# Patient Record
Sex: FEMALE | Race: WHITE | URBAN - METROPOLITAN AREA
[De-identification: names, ages, dates, MRNs, and addresses within clinical notes are randomized per-mention and may not be internally consistent; named-entity substitution may affect disease eponyms.]

---

## 2018-04-02 ENCOUNTER — HOSPITAL ENCOUNTER (EMERGENCY)
Facility: CLINIC | Age: 53
Discharge: HOME | End: 2018-04-02
Attending: PHYSICIAN ASSISTANT | Admitting: PHYSICIAN ASSISTANT
Payer: COMMERCIAL

## 2018-04-02 VITALS
SYSTOLIC BLOOD PRESSURE: 134 MMHG | OXYGEN SATURATION: 97 % | TEMPERATURE: 99.1 F | HEART RATE: 85 BPM | RESPIRATION RATE: 16 BRPM | DIASTOLIC BLOOD PRESSURE: 103 MMHG

## 2018-04-02 DIAGNOSIS — S81.801A WOUND OF RIGHT LOWER EXTREMITY, INITIAL ENCOUNTER: ICD-10-CM

## 2018-04-02 DIAGNOSIS — I83.891 BLEEDING FROM VARICOSE VEINS OF LOWER EXTREMITY, RIGHT: ICD-10-CM

## 2018-04-02 PROCEDURE — 99282 EMERGENCY DEPT VISIT SF MDM: CPT

## 2018-04-02 RX ORDER — LIDOCAINE HYDROCHLORIDE AND EPINEPHRINE 10; 10 MG/ML; UG/ML
INJECTION, SOLUTION INFILTRATION; PERINEURAL
Status: DISCONTINUED
Start: 2018-04-02 | End: 2018-04-02 | Stop reason: HOSPADM

## 2018-04-02 ASSESSMENT — ENCOUNTER SYMPTOMS
FEVER: 0
WOUND: 1

## 2018-04-02 NOTE — ED NOTES
Patient comes in for evaluation of a bleeding wound. Patient states she had a small scratch wound on her leg right near her verucose vein, had cleaned it up this evening with antiseptic and was walking around her kitchen and heard a pop and it started bleeding a lot. Bleeding is currently controlled. ABCs intact.

## 2018-04-02 NOTE — ED AVS SNAPSHOT
[facility] Emergency Department    [address]    [facility] [address]    Phone:  [phone]    Fax:  [phone]                                       Sherley Fraser   MRN: [id number]    Department:  [facility] Emergency Department   Date of Visit:  4/2/2018           After Visit Summary Signature Page     I have received my discharge instructions, and my questions have been answered. I have discussed any challenges I see with this plan with the nurse or doctor.    ..........................................................................................................................................  Patient/Patient Representative Signature      ..........................................................................................................................................  Patient Representative Print Name and Relationship to Patient    ..................................................               ................................................  Date                                            Time    ..........................................................................................................................................  Reviewed by Signature/Title    ...................................................              ..............................................  Date                                                            Time

## 2018-04-03 ENCOUNTER — HOSPITAL ENCOUNTER (EMERGENCY)
Facility: CLINIC | Age: 53
Discharge: HOME | End: 2018-04-03
Attending: INTERNAL MEDICINE | Admitting: INTERNAL MEDICINE
Payer: COMMERCIAL

## 2018-04-03 VITALS
SYSTOLIC BLOOD PRESSURE: 104 MMHG | WEIGHT: 150 LBS | TEMPERATURE: 98.3 F | RESPIRATION RATE: 18 BRPM | OXYGEN SATURATION: 98 % | DIASTOLIC BLOOD PRESSURE: 70 MMHG

## 2018-04-03 DIAGNOSIS — I83.891 HEMORRHAGE OF VARICOSE VEINS OF LOWER EXTREMITY, RIGHT: ICD-10-CM

## 2018-04-03 LAB
BASOPHILS # BLD AUTO: 0.1 10E9/L (ref 0–0.2)
BASOPHILS NFR BLD AUTO: 0.6 %
DIFFERENTIAL METHOD BLD: NORMAL
EOSINOPHIL # BLD AUTO: 0.3 10E9/L (ref 0–0.7)
EOSINOPHIL NFR BLD AUTO: 2.9 %
ERYTHROCYTE [DISTWIDTH] IN BLOOD BY AUTOMATED COUNT: 12.2 % (ref 10–15)
HCT VFR BLD AUTO: 38.9 % (ref 35–47)
HGB BLD-MCNC: 12.8 G/DL (ref 11.7–15.7)
IMM GRANULOCYTES # BLD: 0 10E9/L (ref 0–0.4)
IMM GRANULOCYTES NFR BLD: 0.4 %
LYMPHOCYTES # BLD AUTO: 2 10E9/L (ref 0.8–5.3)
LYMPHOCYTES NFR BLD AUTO: 21 %
MCH RBC QN AUTO: 29.2 PG (ref 26.5–33)
MCHC RBC AUTO-ENTMCNC: 32.9 G/DL (ref 31.5–36.5)
MCV RBC AUTO: 89 FL (ref 78–100)
MONOCYTES # BLD AUTO: 0.8 10E9/L (ref 0–1.3)
MONOCYTES NFR BLD AUTO: 8.3 %
NEUTROPHILS # BLD AUTO: 6.5 10E9/L (ref 1.6–8.3)
NEUTROPHILS NFR BLD AUTO: 66.8 %
NRBC # BLD AUTO: 0 10*3/UL
NRBC BLD AUTO-RTO: 0 /100
PLATELET # BLD AUTO: 284 10E9/L (ref 150–450)
RBC # BLD AUTO: 4.38 10E12/L (ref 3.8–5.2)
WBC # BLD AUTO: 9.7 10E9/L (ref 4–11)

## 2018-04-03 PROCEDURE — 36415 COLL VENOUS BLD VENIPUNCTURE: CPT | Performed by: INTERNAL MEDICINE

## 2018-04-03 PROCEDURE — 25000125 ZZHC RX 250

## 2018-04-03 PROCEDURE — 85025 COMPLETE CBC W/AUTO DIFF WBC: CPT | Performed by: INTERNAL MEDICINE

## 2018-04-03 PROCEDURE — 25000132 ZZH RX MED GY IP 250 OP 250 PS 637: Performed by: INTERNAL MEDICINE

## 2018-04-03 PROCEDURE — 12001 RPR S/N/AX/GEN/TRNK 2.5CM/<: CPT

## 2018-04-03 PROCEDURE — 99283 EMERGENCY DEPT VISIT LOW MDM: CPT

## 2018-04-03 RX ORDER — IBUPROFEN 600 MG/1
600 TABLET, FILM COATED ORAL EVERY 6 HOURS PRN
Qty: 20 TABLET | Refills: 0 | Status: SHIPPED | OUTPATIENT
Start: 2018-04-03

## 2018-04-03 RX ORDER — LIDOCAINE HYDROCHLORIDE AND EPINEPHRINE 10; 10 MG/ML; UG/ML
INJECTION, SOLUTION INFILTRATION; PERINEURAL
Status: COMPLETED
Start: 2018-04-03 | End: 2018-04-03

## 2018-04-03 RX ORDER — IBUPROFEN 600 MG/1
600 TABLET, FILM COATED ORAL ONCE
Status: COMPLETED | OUTPATIENT
Start: 2018-04-03 | End: 2018-04-03

## 2018-04-03 RX ADMIN — IBUPROFEN 600 MG: 600 TABLET ORAL at 02:02

## 2018-04-03 RX ADMIN — LIDOCAINE HYDROCHLORIDE,EPINEPHRINE BITARTRATE: 10; .01 INJECTION, SOLUTION INFILTRATION; PERINEURAL at 02:02

## 2018-04-03 ASSESSMENT — ENCOUNTER SYMPTOMS
DIZZINESS: 1
LIGHT-HEADEDNESS: 1
WOUND: 1

## 2018-04-03 NOTE — ED AVS SNAPSHOT
[facility] Emergency Department    [address] [place] [address]    Phone:  [phone]    Fax:  [phone]                                       Sherley Fraser   MRN: [id number]    Department:  [facility] Emergency Department   Date of Visit:  4/3/2018           Patient Information     Date Of Birth          1965        Your diagnoses for this visit were:     Hemorrhage of varicose veins of lower extremity, right        You were seen by Love Lugo MD.      Discharge References/Attachments     VARICOSE VEINS (ENGLISH)      24 Hour Appointment Hotline       To make an appointment at any [facility] clinic, call [phone] ([phone]). If you don't have a family doctor or clinic, we will help you find one. [facility] clinics are conveniently located to serve the needs of you and your family.             Review of your medicines      Our records show that you are taking the medicines listed below. If these are incorrect, please call your family doctor or clinic.        Dose / Directions Last dose taken    NYQUIL PO        Refills:  0          ASK your doctor about these medications        Dose / Directions Last dose taken    * IBUPROFEN PO   What changed:  Another medication with the same name was added. Make sure you understand how and when to take each.   Ask about: Which instructions should I use?        Refills:  0        * ibuprofen 600 MG tablet   Commonly known as:  ADVIL/MOTRIN   Dose:  600 mg   What changed:  You were already taking a medication with the same name, and this prescription was added. Make sure you understand how and when to take each.   Quantity:  20 tablet   Ask about: Which instructions should I use?        Take 1 tablet (600 mg) by mouth every 6 hours as needed for moderate pain   Refills:  0        * Notice:  This list has 2 medication(s) that are the same as other medications prescribed for you. Read the directions carefully,  and ask your doctor or other care provider to review them with you.            Prescriptions were sent or printed at these locations (1 Prescription)                   Other Prescriptions                Printed at Department/Unit printer (1 of 1)         ibuprofen (ADVIL/MOTRIN) 600 MG tablet                Procedures and tests performed during your visit     CBC + differential      Orders Needing Specimen Collection     None      Pending Results     No orders found from 4/1/2018 to 4/4/2018.            Pending Culture Results     No orders found from 4/1/2018 to 4/4/2018.            Pending Results Instructions     If you had any lab results that were not finalized at the time of your Discharge, you can call the ED Lab Result RN at [phone]. You will be contacted by this team for any positive Lab results or changes in treatment. The nurses are available 7 days a week from 10A to 6:30P.  You can leave a message 24 hours per day and they will return your call.        Test Results From Your Hospital Stay        4/3/2018  1:36 AM      Component Results     Component Value Ref Range & Units Status    WBC 9.7 4.0 - 11.0 10e9/L Final    RBC Count 4.38 3.8 - 5.2 10e12/L Final    Hemoglobin 12.8 11.7 - 15.7 g/dL Final    Hematocrit 38.9 35.0 - 47.0 % Final    MCV 89 78 - 100 fl Final    MCH 29.2 26.5 - 33.0 pg Final    MCHC 32.9 31.5 - 36.5 g/dL Final    RDW 12.2 10.0 - 15.0 % Final    Platelet Count 284 150 - 450 10e9/L Final    Diff Method Automated Method  Final    % Neutrophils 66.8 % Final    % Lymphocytes 21.0 % Final    % Monocytes 8.3 % Final    % Eosinophils 2.9 % Final    % Basophils 0.6 % Final    % Immature Granulocytes 0.4 % Final    Nucleated RBCs 0 0 /100 Final    Absolute Neutrophil 6.5 1.6 - 8.3 10e9/L Final    Absolute Lymphocytes 2.0 0.8 - 5.3 10e9/L Final    Absolute Monocytes 0.8 0.0 - 1.3 10e9/L Final    Absolute Eosinophils 0.3 0.0 - 0.7 10e9/L Final    Absolute Basophils 0.1 0.0 - 0.2 10e9/L  Final    Abs Immature Granulocytes 0.0 0 - 0.4 10e9/L Final    Absolute Nucleated RBC 0.0  Final                Clinical Quality Measure: Blood Pressure Screening     Your blood pressure was checked while you were in the emergency department today. The last reading we obtained was  BP: 126/85 (Simultaneous filing. User may not have seen previous data.) . Please read the guidelines below about what these numbers mean and what you should do about them.  If your systolic blood pressure (the top number) is less than 120 and your diastolic blood pressure (the bottom number) is less than 80, then your blood pressure is normal. There is nothing more that you need to do about it.  If your systolic blood pressure (the top number) is 120-139 or your diastolic blood pressure (the bottom number) is 80-89, your blood pressure may be higher than it should be. You should have your blood pressure rechecked within a year by a primary care provider.  If your systolic blood pressure (the top number) is 140 or greater or your diastolic blood pressure (the bottom number) is 90 or greater, you may have high blood pressure. High blood pressure is treatable, but if left untreated over time it can put you at risk for heart attack, stroke, or kidney failure. You should have your blood pressure rechecked by a primary care provider within the next 4 weeks.  If your provider in the emergency department today gave you specific instructions to follow-up with your doctor or provider even sooner than that, you should follow that instruction and not wait for up to 4 weeks for your follow-up visit.        Thank you for choosing [facility]       Thank you for choosing [facility] for your care. Our goal is always to provide you with excellent care. Hearing back from our patients is one way we can continue to improve our services. Please take a few minutes to complete the written survey that you may receive in the mail after you visit with us. Thank you!    "     MyChart Information     [facility] lets you send messages to your doctor, view your test results, renew your prescriptions, schedule appointments and more. To sign up, go to [email or web address] . Click on \"Log in\" on the left side of the screen, which will take you to the Welcome page. Then click on \"Sign up Now\" on the right side of the page.     You will be asked to enter the access code listed below, as well as some personal information. Please follow the directions to create your username and password.     Your access code is: [email or web address]  Expires: 2018  9:20 PM     Your access code will  in 90 days. If you need help or a new code, please call your [facility] clinic or [phone].        Care EveryWhere ID     This is your Care EveryWhere ID. This could be used by other organizations to access your [facility] medical records  [id number]        Equal Access to Services     FERMIN GODOY : Hadyaneli edwardso Sohesham, waaxda lufavian, qaybta kaalmachidi pritchard, esau bailey . So [facility] [phone].    ATENCIÓN: Si habla español, tiene a richards disposición servicios gratuitos de asistencia lingüística. Llame al [phone].    We comply with applicable federal civil rights laws and [place] laws. We do not discriminate on the basis of race, color, national origin, age, disability, sex, sexual orientation, or gender identity.            After Visit Summary       This is your record. Keep this with you and show to your community pharmacist(s) and doctor(s) at your next visit.                  "

## 2018-04-03 NOTE — ED AVS SNAPSHOT
[facility] Emergency Department    [address]    [facility] [address]    Phone:  [phone]    Fax:  [phone]                                       Sherley Fraser   MRN: [id number]    Department:  [facility] Emergency Department   Date of Visit:  4/3/2018           After Visit Summary Signature Page     I have received my discharge instructions, and my questions have been answered. I have discussed any challenges I see with this plan with the nurse or doctor.    ..........................................................................................................................................  Patient/Patient Representative Signature      ..........................................................................................................................................  Patient Representative Print Name and Relationship to Patient    ..................................................               ................................................  Date                                            Time    ..........................................................................................................................................  Reviewed by Signature/Title    ...................................................              ..............................................  Date                                                            Time

## 2018-04-03 NOTE — ED PROVIDER NOTES
Emergency Department Attending Supervision Note  4/2/2018  8:42 PM      I evaluated this patient in conjunction with Leyla ORELLANA      Briefly, the patient presented with bleeding to the right lower extremity.  Patient noted that over the last couple months she has developed a ulcer to the right medial lower leg.  She has had a number of evaluations in which ultimately the ulceration has continued without obvious source.  She is scheduled to see a wound clinic tomorrow.  This evening she heard a pop with subsequent spontaneous bleeding from the wound.  She was unable to control the bleeding with direct pressure.      On my exam, patient has a 2 cm ulceration without surrounding erythema, warmth or tenderness to the right medial lower leg just proximal to the ankle.  There is minimal bleeding at this time.    The area was injected with lidocaine with epinephrine and Gelfoam applied with good hemostasis.  Patient was rechecked with no residual bleeding.  Patient to follow-up with wound clinic for general wound management but may also need referral to dermatology to ensure that this atraumatic wound does not represent atypical malignancy.        Diagnosis    (S81.801A) Wound of right lower extremity, initial encounter    (I83.891) Bleeding from varicose veins of lower extremity, right    Tato Bello MD  04/02/18 8316

## 2018-04-03 NOTE — ED PROVIDER NOTES
History     Chief Complaint:  Wound check    HPI   Sherley Fraser is a 53 year old female who presents for a wound check. The patient reports that she was seen here in the emergency department earlier today due to a wound on her right medial shin. The wound was evaluated and bandaged with gel foam. The patient arrived home this evening and when brushing her teeth she noticed that the wound was bleeding through the bandaging. She then became light headed and dizzy which caused her to become concerned and her  called 911. Here in the ED the patient states that her light headedness has improved. Of note the patient does have an appointment with the wound clinic tomorrow.    Allergies:  Bactrim     Medications:    Bactrim cream    Past Medical History:    The patient denies any relevant past medical history.     Past Surgical History:    Cardiac radiofrequency ablation    Family History:    The patient denies any relevant family medical history.     Social History:  Marital Status:   [2]    Review of Systems   Skin: Positive for wound.   Neurological: Positive for dizziness and light-headedness.   All other systems reviewed and are negative.    Physical Exam   Vitals:  Patient Vitals for the past 24 hrs:   BP Temp Temp src Heart Rate Resp SpO2 Weight   04/03/18 0209 104/70 98.3  F (36.8  C) Oral 78 18 98 % -   04/03/18 0130 - - - - - 96 % -   04/03/18 0115 - - - - - 96 % -   04/03/18 0100 - - - - - 98 % -   04/03/18 0055 126/85 98  F (36.7  C) Oral 76 18 98 % 68 kg (150 lb)        Physical Exam   Constitutional: She is cooperative.   Cardiovascular: Normal pulses.    Musculoskeletal:        Legs:  Neurological: She is alert. No sensory deficit.   Skin: Lesion noted.       Emergency Department Course     Laboratory:  Laboratory findings were communicated with the patient who voiced understanding of the findings.  CBC: AWNL (WBC 9.7, HGB 12.8, )     Interventions:  0202 Advil 600 mg  oral    Procedures:     Suture Placement         LOCATION:  Right lower leg.    FUNCTION:  Distally sensation and circulation are intact.    ANESTHESIA:  Local using Lidocaine 1% with epinephrine. With injection, there was a small amount of bleeding from upper edge of ulcer.      PREPARATION:  Soft scrub with surclens.    SUTURES:  Two figure of x sutures were placed, over edge that had bleeding and over central prominence.       Emergency Department Course:  Nursing notes and vitals reviewed.  I performed an exam of the patient as documented above.   IV was inserted and blood was drawn for laboratory testing, results above.    I discussed the treatment plan with the patient. They expressed understanding of this plan and consented to discharge. They will be discharged home with instructions for care and follow up. In addition, the patient will return to the emergency department if their symptoms persist, worsen, if new symptoms arise or if there is any concern.  All questions were answered.     I personally reviewed the laboratory results with the patient and answered all related questions prior to discharge.    Impression & Plan      Medical Decision Making:  Sherley Fraser is a 53 year old female seen in this emergency department only hours ago who returned after her leg ulcer began bleeding again spontaneously. With cleaning here, there was a small area at the edge of the wound with bleeding as well as a prominent central area that appeared to perhaps be a varicosity. Given her return, I placed two figure of x sutures in this area. There was no further bleeding here. We did have her ambulate which caused no bleeding. She felt light headed at home. CBC was checked and hemoglobin is adequate at 12.8. She has a wound clinic appointment in the morning which I am eager for her to keep. I will have her rest at home, return if bleeding occurs more.Keep clinic appointment in the morning.      Diagnosis:    ICD-10-CM    1.  Hemorrhage of varicose veins of lower extremity, right I83.891         Disposition:   Discharged    CMS Diagnoses: None     Discharge Medications:  Discharge Medication List as of 4/3/2018  2:06 AM      START taking these medications    Details   !! ibuprofen (ADVIL/MOTRIN) 600 MG tablet Take 1 tablet (600 mg) by mouth every 6 hours as needed for moderate pain, Disp-20 tablet, R-0, Local Print       !! - Potential duplicate medications found. Please discuss with provider.          Scribe Disclosure:  I, Javi Rajput, am serving as a scribe at 12:57 AM on 4/3/2018 to document services personally performed by Love Lugo MD, based on my observations and the provider's statements to me.   [facility] EMERGENCY DEPARTMENT       Love Lugo MD  04/03/18 8431

## 2018-04-03 NOTE — ED NOTES
Bed: ED15  Expected date: 4/3/18  Expected time: 12:51 AM  Means of arrival: Ambulance  Comments:  [phone]

## 2018-04-03 NOTE — DISCHARGE INSTRUCTIONS
Please see wound specialist as scheduled tomorrow. Please return for uncontrolled bleeding, fever, spreading redness, or any other concerning symptoms.     Varicose Veins     Varicose veins are swollen, enlarged veins most often found in the legs. They are usually blue or purple in color and may bulge, twist, and stand out under the skin.  Normally, veins return blood from the body to the heart. The leg veins have one-way valves that prevent blood from flowing backward in the vein. When the valves are weak or damaged, blood backs up in the veins. This may cause some of the veins to swell and bulge and become varicose veins.  Symptoms  Varicose veins may or may not cause symptoms. If symptoms do occur, they can include:    Legs that feel tired, achy, heavy, or itchy    Leg muscle cramps    Skin changes, such as discoloration, dryness, redness, or rash (in more severe cases, you may also have sores on the skin called venous leg ulcers)  Risk Factors  There are a number of factors that increase the risk for varicose veins. These can include:    Being a woman    Being older    Sitting or standing for long periods    Being overweight    Being pregnant    Having a family history of varicose veins  Treatment begins with simple self-help measures (see below). If these don t help, there are many procedures that can be done to shrink or remove varicose veins. Your healthcare provider can tell you more about these options, if needed.  Home care    Support or compression stockings will likely be prescribed. If so, be sure to wear them as directed. They may help improve blood flow.    Exercising helps strengthen your leg muscles and improve blood flow. To get the most benefit, choose exercises such as walking, swimming, or cycling. Also try to exercise for at least 30 minutes on most days.    Raising (elevating) your legs lets gravity help blood flow back to the heart. Sit or lie with your feet above heart level a few times  throughout the day, or as directed.    Avoid long periods of sitting or standing. Change positions often. Also, move your ankles, toes and knees often. This may also help improve blood flow.    If you are overweight, talk with your healthcare provider about setting up a weight-loss plan. Maintaining a healthy weight can help reduce the strain on your veins. It may also improve symptoms, such as swelling and aching.    If you have dryness and itching, ask your provider about special lotions that can be applied to the skin to help improve symptoms.  Follow-up care  Follow up with your healthcare provider, or as directed. If imaging tests were done, you ll be told the results and if there are any new findings that affect your care.  When to seek medical advice  Call your healthcare provider right away if any of these occur:    Sudden, severe leg swelling, pain, or redness    Symptoms worsen, or they don t improve with self-care    Bleeding from any affected veins    Ulcers form on the legs, ankles, or feet    Fever of 100.4 F (38 C) or higher, or as advised by your provider  Date Last Reviewed: 9/21/2015 2000-2017 [employer]. [address] [place] [address]. All rights reserved. This information is not intended as a substitute for professional medical care. Always follow your healthcare professional's instructions.

## 2018-04-03 NOTE — ED NOTES
Pt complains of dizziness and blood draining from vericose vein that was treated earlier here.  Per EMS no blood draining with dressing change

## 2024-06-04 NOTE — ED PROVIDER NOTES
"  History     Chief Complaint:  Wound Check    HPI   Sherley Fraser is a 53 year old female who presents to the emergency department today for evaluation of a wound check. The patient reports she has had a chronic sore on her right lower leg for a couple of months.  She noted this was near her varicose vein. She went to an [facility] for evaluation a month ago and was started on antibiotics and given a topical cream. However, there has been no significant change. Then tonight after changing the dressing and walking around her kitchen she head a \"pop\" with subsequent blood dripping from the wound. She is unable to stop the bleeding, therefore, prompting the visit to the ED for further evaluation. Patient endorses pain to the area, but did not take tylenol or ibuprofen prior to arrival. The patient denies fevers, lightheadedness, dizziness, or any other acute symptoms.     Allergies:  No Known Drug Allergies     Medications:    The patient is currently on no regular medications.    Past Medical History:    History reviewed. No pertinent past medical history.    Past Surgical History:    Cardiac radiofrequency ablation    Family History:    History reviewed. No pertinent family history.     Social History:  The patient was accompanied to the ED by with her daughter.  Marital Status:     Review of Systems   Constitutional: Negative for fever.   Skin: Positive for wound (right lower leg).   All other systems reviewed and are negative.    Physical Exam     Patient Vitals for the past 24 hrs:   BP Temp Temp src Pulse Resp SpO2   04/02/18 1842 (!) 134/103 99.1  F (37.3  C) Temporal 85 16 97 %     Physical Exam  General: Resting comfortably.  Alert and oriented.   Head:  The scalp, face, and head appear normal   CV:  Regular rate and rhythm     Normal S1/S2    No pathological murmur detected   Resp:  Lungs are clear to auscultation    Non-labored    No rales or wheezing   Skin:  Chronic appearing ulcer noted to the " 2024  EMPLOYEE INFORMATION: EMPLOYER INFORMATION:   NAME: Rubio MELENDEZ [employer]   : 1985 [phone]    DATE OF INJURY/EVENT: 6/3/2024           Location: [facility] [place]   Treating Provider: Mau Martel DO  Time In:  2:42 PM Time Out:  4:13 PM        DIAGNOSIS:   1. Cellulitis of finger of right hand    2. Encounter related to worker's compensation claim    3. Puncture wound of finger, initial encounter      STATUS: This injury is determined to be WORK RELATED.    RETURN TO WORK: Employee may return to work without restrictions.               RESTRICTIONS:  No Restrictions    TREATMENT PLAN:   Medications for this injury/condition: Take your antibiotic as prescribed, follow the directions on the bottle.  Take the antibiotic with a yogurt or probiotic to replenish good bacteria in your stomach/intestinal tract while taking the antibiotic.    Ibuprofen: Take 200 mg (one over the counter tablet) every 4 to 6 hours as needed; if no relief, may increase to 400 mg every 4 to 6 hours as needed; maximum dose: 1.2 g/day.  You can also take over the counter Acetaminophen (tylenol) as needed, follow instructions on the bottle.      Referral/Consult: None  Diagnostic Testing: XR FINGER(S) 2 OR MORE VIEWS RIGHT       Instructions: Return if there is any worsening to the redness, warmth, and swelling while taking the antibiotic.    Keep your puncture wound covered with a band-aid and change your bandaging if it becomes wet or soiled at work and home.     NEXT RETURN VISIT:  3 days for a wound check.     2024 at 3:00 pm          Thank you for the privilege of providing medical care for this injury/condition.  If there are any questions, please call the occupational health clinic at Dept: [phone].      Electronically signed on 2024 at 4:13 PM by:   Mau Martel DO   [facility]       right medial lower extremity with no signs of infection. Varicose vein is bleeding, but is controlled with pressure.     Emergency Department Course     Emergency Department Course:  Nursing notes and vitals reviewed.  2005: I performed an exam of the patient as documented above.   2030: Dr. Barrow evaluated the patient.   2106: Patient rechecked and updated.   Findings and plan explained to the Patient and daughter. Patient discharged home with instructions regarding supportive care, medications, and reasons to return. The importance of close follow-up was reviewed.     Impression & Plan      Medical Decision Making:  Sherley Fraser is a 53 year old female who presents to the emergency department today for evaluation of a wound check.  She presents vitally stable and afebrile.  On exam there is an obvious varicose vein that is bleeding.  This is in conjunction with a chronic appearing ulcer to the right lower leg.  There are no signs of infection or surrounding cellulitis.  Given the presentation, Dr. Barrow was consulted and staffed this patient with me.  Please refer to his note for further details.  Lidocaine with epinephrine was infiltrated around the vein which stopped the bleeding.  Then, Surgicel was applied and a pressure dressing.  The patient was observed for 30 minutes without rebleeding.  The wound was dressed with appropriate bandage.  The patient does have a follow-up appointment with wound specialist tomorrow.  She was encouraged to make this appointment.  There are no signs to warrant antibiotics at this time.  She is asked to return to the ED immediately for uncontrolled bleeding, dizziness, lightheadedness, or any other concerning symptoms. All questions were answered prior to discharge. The patient understands and agrees to this plan.      Diagnosis:    ICD-10-CM    1. Wound of right lower extremity, initial encounter S81.801A    2. Bleeding from varicose veins of lower extremity, right I83.891       Disposition:  Discharged to home    Scribe Disclosure:  I, Nissa Zhang, am serving as a scribe at 7:59 PM on 4/2/2018 to document services personally performed by Leyla Davila PA-C based on my observations and the provider's statements to me.     4/2/2018   [facility] EMERGENCY DEPARTMENT       Leyla Davila PA-C  04/03/18 0050